# Patient Record
Sex: MALE | URBAN - METROPOLITAN AREA
[De-identification: names, ages, dates, MRNs, and addresses within clinical notes are randomized per-mention and may not be internally consistent; named-entity substitution may affect disease eponyms.]

---
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
## 2025-07-10 NOTE — PROCEDURE: ORDER TESTS
clear to auscultation bilaterally/no wheezes/no respiratory distress/good air movement/respirations non-labored
Billing Type: Third-Party Bill
Expected Date Of Service: 06/20/2022
Bill For Surgical Tray: no
Performing Laboratory: [id number]